# Patient Record
Sex: FEMALE | Race: WHITE | Employment: OTHER | URBAN - METROPOLITAN AREA
[De-identification: names, ages, dates, MRNs, and addresses within clinical notes are randomized per-mention and may not be internally consistent; named-entity substitution may affect disease eponyms.]

---

## 2022-06-25 ENCOUNTER — TRANSCRIBE ORDER (OUTPATIENT)
Dept: SCHEDULING | Age: 67
End: 2022-06-25

## 2022-06-25 DIAGNOSIS — R07.0 THROAT PAIN: ICD-10-CM

## 2022-06-25 DIAGNOSIS — R05.9 COUGH: ICD-10-CM

## 2022-06-25 DIAGNOSIS — Z12.11 SCREEN FOR COLON CANCER: ICD-10-CM

## 2022-06-25 DIAGNOSIS — K21.9 GASTROESOPHAGEAL REFLUX DISEASE: Primary | ICD-10-CM

## 2022-07-13 ENCOUNTER — HOSPITAL ENCOUNTER (OUTPATIENT)
Dept: GENERAL RADIOLOGY | Age: 67
Discharge: HOME | End: 2022-07-13
Attending: INTERNAL MEDICINE
Payer: MEDICARE

## 2022-07-13 DIAGNOSIS — R07.0 THROAT PAIN: ICD-10-CM

## 2022-07-13 DIAGNOSIS — K21.9 GASTROESOPHAGEAL REFLUX DISEASE: ICD-10-CM

## 2022-07-13 DIAGNOSIS — R05.9 COUGH: ICD-10-CM

## 2022-07-13 DIAGNOSIS — Z12.11 SCREEN FOR COLON CANCER: ICD-10-CM

## 2022-07-13 PROCEDURE — 74220 X-RAY XM ESOPHAGUS 1CNTRST: CPT

## 2022-11-11 RX ORDER — AMLODIPINE BESYLATE 5 MG/1
5 TABLET ORAL DAILY
COMMUNITY

## 2022-11-11 RX ORDER — SPIRONOLACTONE 50 MG/1
50 TABLET, FILM COATED ORAL DAILY
COMMUNITY

## 2022-11-11 RX ORDER — PANTOPRAZOLE SODIUM 40 MG/1
40 TABLET, DELAYED RELEASE ORAL DAILY
COMMUNITY

## 2022-11-21 ENCOUNTER — HOSPITAL ENCOUNTER (OUTPATIENT)
Age: 67
Setting detail: OUTPATIENT SURGERY
Discharge: HOME | End: 2022-11-21
Attending: INTERNAL MEDICINE | Admitting: INTERNAL MEDICINE
Payer: MEDICARE

## 2022-11-21 ENCOUNTER — ANESTHESIA (OUTPATIENT)
Dept: ENDOSCOPY | Age: 67
End: 2022-11-21
Payer: MEDICARE

## 2022-11-21 ENCOUNTER — ANESTHESIA EVENT (OUTPATIENT)
Dept: ENDOSCOPY | Age: 67
End: 2022-11-21
Payer: MEDICARE

## 2022-11-21 VITALS
DIASTOLIC BLOOD PRESSURE: 87 MMHG | WEIGHT: 178 LBS | RESPIRATION RATE: 19 BRPM | HEIGHT: 64 IN | HEART RATE: 75 BPM | TEMPERATURE: 98.5 F | SYSTOLIC BLOOD PRESSURE: 99 MMHG | BODY MASS INDEX: 30.39 KG/M2 | OXYGEN SATURATION: 94 %

## 2022-11-21 PROCEDURE — 77030021593 HC FCPS BIOP ENDOSC BSC -A: Performed by: INTERNAL MEDICINE

## 2022-11-21 PROCEDURE — 76040000007: Performed by: INTERNAL MEDICINE

## 2022-11-21 PROCEDURE — 88305 TISSUE EXAM BY PATHOLOGIST: CPT

## 2022-11-21 PROCEDURE — 74011250637 HC RX REV CODE- 250/637: Performed by: INTERNAL MEDICINE

## 2022-11-21 PROCEDURE — 77030041453 HC CAPSULE BRAVO REFLX SYS GVM -B: Performed by: INTERNAL MEDICINE

## 2022-11-21 PROCEDURE — 74011250636 HC RX REV CODE- 250/636: Performed by: ANESTHESIOLOGY

## 2022-11-21 PROCEDURE — 2709999900 HC NON-CHARGEABLE SUPPLY: Performed by: INTERNAL MEDICINE

## 2022-11-21 PROCEDURE — 76060000032 HC ANESTHESIA 0.5 TO 1 HR: Performed by: INTERNAL MEDICINE

## 2022-11-21 PROCEDURE — 74011000250 HC RX REV CODE- 250: Performed by: ANESTHESIOLOGY

## 2022-11-21 RX ORDER — SODIUM CHLORIDE 0.9 % (FLUSH) 0.9 %
5-40 SYRINGE (ML) INJECTION AS NEEDED
Status: DISCONTINUED | OUTPATIENT
Start: 2022-11-21 | End: 2022-11-21 | Stop reason: HOSPADM

## 2022-11-21 RX ORDER — MIDAZOLAM HYDROCHLORIDE 1 MG/ML
.25-1 INJECTION, SOLUTION INTRAMUSCULAR; INTRAVENOUS
Status: DISCONTINUED | OUTPATIENT
Start: 2022-11-21 | End: 2022-11-21 | Stop reason: HOSPADM

## 2022-11-21 RX ORDER — ATROPINE SULFATE 0.1 MG/ML
0.5 INJECTION INTRAVENOUS
Status: DISCONTINUED | OUTPATIENT
Start: 2022-11-21 | End: 2022-11-21 | Stop reason: HOSPADM

## 2022-11-21 RX ORDER — FENTANYL CITRATE 50 UG/ML
100 INJECTION, SOLUTION INTRAMUSCULAR; INTRAVENOUS
Status: DISCONTINUED | OUTPATIENT
Start: 2022-11-21 | End: 2022-11-21 | Stop reason: HOSPADM

## 2022-11-21 RX ORDER — DEXTROMETHORPHAN/PSEUDOEPHED 2.5-7.5/.8
1.2 DROPS ORAL
Status: DISCONTINUED | OUTPATIENT
Start: 2022-11-21 | End: 2022-11-21 | Stop reason: HOSPADM

## 2022-11-21 RX ORDER — SODIUM CHLORIDE 9 MG/ML
50 INJECTION, SOLUTION INTRAVENOUS CONTINUOUS
Status: DISCONTINUED | OUTPATIENT
Start: 2022-11-21 | End: 2022-11-21 | Stop reason: HOSPADM

## 2022-11-21 RX ORDER — LIDOCAINE HYDROCHLORIDE 20 MG/ML
INJECTION, SOLUTION EPIDURAL; INFILTRATION; INTRACAUDAL; PERINEURAL AS NEEDED
Status: DISCONTINUED | OUTPATIENT
Start: 2022-11-21 | End: 2022-11-21 | Stop reason: HOSPADM

## 2022-11-21 RX ORDER — SODIUM CHLORIDE 9 MG/ML
INJECTION, SOLUTION INTRAVENOUS
Status: DISCONTINUED | OUTPATIENT
Start: 2022-11-21 | End: 2022-11-21 | Stop reason: HOSPADM

## 2022-11-21 RX ORDER — NALOXONE HYDROCHLORIDE 0.4 MG/ML
0.4 INJECTION, SOLUTION INTRAMUSCULAR; INTRAVENOUS; SUBCUTANEOUS
Status: DISCONTINUED | OUTPATIENT
Start: 2022-11-21 | End: 2022-11-21 | Stop reason: HOSPADM

## 2022-11-21 RX ORDER — SODIUM CHLORIDE 0.9 % (FLUSH) 0.9 %
5-40 SYRINGE (ML) INJECTION EVERY 8 HOURS
Status: DISCONTINUED | OUTPATIENT
Start: 2022-11-21 | End: 2022-11-21 | Stop reason: HOSPADM

## 2022-11-21 RX ORDER — PROPOFOL 10 MG/ML
INJECTION, EMULSION INTRAVENOUS AS NEEDED
Status: DISCONTINUED | OUTPATIENT
Start: 2022-11-21 | End: 2022-11-21 | Stop reason: HOSPADM

## 2022-11-21 RX ORDER — EPINEPHRINE 0.1 MG/ML
1 INJECTION INTRACARDIAC; INTRAVENOUS
Status: DISCONTINUED | OUTPATIENT
Start: 2022-11-21 | End: 2022-11-21 | Stop reason: HOSPADM

## 2022-11-21 RX ORDER — FLUMAZENIL 0.1 MG/ML
0.2 INJECTION INTRAVENOUS
Status: DISCONTINUED | OUTPATIENT
Start: 2022-11-21 | End: 2022-11-21 | Stop reason: HOSPADM

## 2022-11-21 RX ADMIN — LIDOCAINE HYDROCHLORIDE 100 MG: 20 INJECTION, SOLUTION EPIDURAL; INFILTRATION; INTRACAUDAL; PERINEURAL at 10:29

## 2022-11-21 RX ADMIN — PROPOFOL 40 MG: 10 INJECTION, EMULSION INTRAVENOUS at 10:45

## 2022-11-21 RX ADMIN — SODIUM CHLORIDE: 900 INJECTION, SOLUTION INTRAVENOUS at 10:20

## 2022-11-21 RX ADMIN — LIDOCAINE HYDROCHLORIDE 100 MG: 20 INJECTION, SOLUTION EPIDURAL; INFILTRATION; INTRACAUDAL; PERINEURAL at 10:41

## 2022-11-21 RX ADMIN — PROPOFOL 40 MG: 10 INJECTION, EMULSION INTRAVENOUS at 10:37

## 2022-11-21 RX ADMIN — PROPOFOL 30 MG: 10 INJECTION, EMULSION INTRAVENOUS at 10:34

## 2022-11-21 RX ADMIN — PROPOFOL 80 MG: 10 INJECTION, EMULSION INTRAVENOUS at 10:29

## 2022-11-21 RX ADMIN — PROPOFOL 80 MG: 10 INJECTION, EMULSION INTRAVENOUS at 10:32

## 2022-11-21 RX ADMIN — PROPOFOL 50 MG: 10 INJECTION, EMULSION INTRAVENOUS at 10:57

## 2022-11-21 RX ADMIN — PROPOFOL 50 MG: 10 INJECTION, EMULSION INTRAVENOUS at 10:40

## 2022-11-21 RX ADMIN — PROPOFOL 40 MG: 10 INJECTION, EMULSION INTRAVENOUS at 10:42

## 2022-11-21 RX ADMIN — PROPOFOL 40 MG: 10 INJECTION, EMULSION INTRAVENOUS at 10:49

## 2022-11-21 RX ADMIN — PROPOFOL 50 MG: 10 INJECTION, EMULSION INTRAVENOUS at 10:53

## 2022-11-21 NOTE — DISCHARGE INSTRUCTIONS
[address]  [address], [address] [facility]                                  Brock Husain  [id number]  1955    It was my pleasure seeing you for your procedure. You will also receive a summary report with the findings from this procedure and any further recommendations. If you had polyps removed or biopsies taken during your procedure, you will receive a separate letter from me within the next 2 weeks. If you don't receive this letter or if you have any questions, please call my office [phone]. Please take note of the post procedure instructions listed below. Terry Wishes,    Dr. Justin Rueda    These instructions give you information on caring for yourself after your procedure. Call your doctor if you have any problems or questions after your procedure. HOME CARE  Walk if you have belly cramping or gas. Walking will help get rid of the air and reduce the bloated feeling in your belly (abdomen). Your IV site (where you received drugs) may be tender to touch. Place warm towels on the site; keep your arm up on two pillows if you have any swelling or soreness in the area. You may shower. ACTIVITY:  Take frequent rest periods and move at a slower pace for the next 24 hours. .  You may resume your regular activity tomorrow if you are feeling back to normal.  Do not drive or ride a bicycle for at least 24 hours (because of the medicine (anesthesia) used during the test). Do not sign any important legal documents or use or operate any machinery for 24 hours  Do not take sleeping medicines/nerve drugs for 24 hours unless the doctor tells you. You can return to work/school tomorrow unless otherwise instructed. NUTRITION:  Drink plenty of fluids to keep your pee (urine) clear or pale yellow  Begin with a light meal and progress to your normal diet.  Heavy or fried foods are harder to digest and may make you feel sick to your stomach (nauseated). Once you are feeling back to normal, you may resume your normal diet as instructed by your doctor. Avoid alcoholic beverages for 24 hours or as instructed. IF YOU HAD BIOPSIES TAKEN OR POLYPS REMOVED DURING THE PROCEDURE:  For the next 7 days, avoid all non-steroidal antiinflammatory medications such as Ibuprofen, Motrin, Advil, Alleve, Ning-seltzer, Goody's powder, BC powder. If you do not have an heart condition that requires you to take a daily aspirin, you should avoid taking aspirin for 7 days. Eat a soft diet for 24 hours. Monitor your stools for any blood or dark black, tar-like, stools as this may be a sign of bleeding and if you see any blood, notify your doctor immediately. GET HELP RIGHT AWAY AND SEEK IMMEDIATE MEDICAL CARE IF:  You have more than a spotting of blood in your stool. You pass clumps of tissue (blood clots) or fill the toilet with blood. Your belly is painfully swollen or puffy (abdominal distention). You throw up (vomit). You have a fever. You have redness, pain or swelling at the IV site that last greater than two days. You have abdominal pain or discomfort that is severe or gets worse throughout the day. Post-procedure diagnosis:  1. - Normal Colon  2. - Esophagitis  3. - Hiatal Hernia    Post-procedure recommendations:      EGD Findings:  Esophagus: subtle linear furrows throughout esophagus, biopsies obtained. GE junction 37 cm from the incisors with grade A esophagitis. 3 cm sliding hiatal hernia with intermittent reflux visualized into esophagus.  Bravo clip deployed 6 cm above GE junction   Stomach:normal mucosa, biopsied   Duodenum/jejunum:mild duodenitis, biopsied     Colon Findings:   Rectum: normal  Sigmoid: normal mucosa - biopsied   Descending Colon: normal mucosa - biopsied   Transverse Colon:  normal mucosa - biopsied   Ascending Colon:  normal mucosa - biopsied   Cecum: normal  Terminal Ileum: normal      Recommendations:   - Await pathology and bravo results. You should receive a letter within 2 weeks. - Resume normal medications.  - Recommend repeat colonoscopy in 10 years. Learning About Coronavirus [phone])  Coronavirus [phone]): Overview  What is coronavirus (COVID-19)? The coronavirus disease (COVID-19) is caused by a virus. It is an illness that was first found in [place], [place], in December 2019. It has since spread worldwide. The virus can cause fever, cough, and trouble breathing. In severe cases, it can cause pneumonia and make it hard to breathe without help. It can cause death. Coronaviruses are a large group of viruses. They cause the common cold. They also cause more serious illnesses like Middle East respiratory syndrome (MERS) and severe acute respiratory syndrome (SARS). COVID-19 is caused by a novel coronavirus. That means it's a new type that has not been seen in people before. This virus spreads person-to-person through droplets from coughing and sneezing. It can also spread when you are close to someone who is infected. And it can spread when you touch something that has the virus on it, such as a doorknob or a tabletop. What can you do to protect yourself from coronavirus (COVID-19)? The best way to protect yourself from getting sick is to: Avoid areas where there is an outbreak. Avoid contact with people who may be infected. Wash your hands often with soap or alcohol-based hand sanitizers. Avoid crowds and try to stay at least 6 feet away from other people. Wash your hands often, especially after you cough or sneeze. Use soap and water, and scrub for at least 20 seconds. If soap and water aren't available, use an alcohol-based hand . Avoid touching your mouth, nose, and eyes. What can you do to avoid spreading the virus to others? To help avoid spreading the virus to others:  Cover your mouth with a tissue when you cough or sneeze. Then throw the tissue in the trash.   Use a disinfectant to clean things that you touch often. Stay home if you are sick or have been exposed to the virus. Don't go to school, work, or public areas. And don't use public transportation. If you are sick:  Leave your home only if you need to get medical care. But call the doctor's office first so they know you're coming. And wear a face mask, if you have one. If you have a face mask, wear it whenever you're around other people. It can help stop the spread of the virus when you cough or sneeze. Clean and disinfect your home every day. Use household  and disinfectant wipes or sprays. Take special care to clean things that you grab with your hands. These include doorknobs, remote controls, phones, and handles on your refrigerator and microwave. And don't forget countertops, tabletops, bathrooms, and computer keyboards. When to call for help  Call 911 anytime you think you may need emergency care. For example, call if:  You have severe trouble breathing. (You can't talk at all.)  You have constant chest pain or pressure. You are severely dizzy or lightheaded. You are confused or can't think clearly. Your face and lips have a blue color. You pass out (lose consciousness) or are very hard to wake up. Call your doctor now if you develop symptoms such as:  Shortness of breath. Fever. Cough. If you need to get care, call ahead to the doctor's office for instructions before you go. Make sure you wear a face mask, if you have one, to prevent exposing other people to the virus. Where can you get the latest information? The following health organizations are tracking and studying this virus. Their websites contain the most up-to-date information. [place] also learn what to do if you think you may have been exposed to the virus. U.S. Centers for Disease Control and Prevention (CDC): The CDC provides updated news about the disease and travel advice.  The website also tells you how to prevent the spread of infection. www.cdc.gov  World Health Organization [facility]): WHO offers information about the virus outbreaks. WHO also has travel advice. www.who.int  Current as of: April 1, 2020               Content Version: 12.4  © 6406-7138 Healthwise, [employer]. Care instructions adapted under license by your healthcare professional. If you have questions about a medical condition or this instruction, always ask your healthcare professional. [address] any warranty or liability for your use of this information. Hiatal Hernia: Care Instructions  Your Care Instructions  A hiatal hernia occurs when part of the stomach bulges into the chest cavity. A hiatal hernia may allow stomach acid and juices to back up into the esophagus (acid reflux). This can cause a feeling of burning, warmth, heat, or pain behind the breastbone. This feeling may often occur after you eat, soon after you lie down, or when you bend forward, and it may come and go. You also may have a sour taste in your mouth. These symptoms are commonly known as heartburn or reflux. But not all hiatal hernias cause symptoms. Follow-up care is a key part of your treatment and safety. Be sure to make and go to all appointments, and call your doctor if you are having problems. It's also a good idea to know your test results and keep a list of the medicines you take. How can you care for yourself at home? Take your medicines exactly as prescribed. Call your doctor if you think you are having a problem with your medicine. Do not take aspirin or other nonsteroidal anti-inflammatory drugs (NSAIDs), such as ibuprofen (Advil, Motrin) or naproxen (Aleve), unless your doctor says it is okay. Ask your doctor what you can take for pain. Your doctor may recommend over-the-counter medicine. For mild or occasional indigestion, antacids such as Tums, Gaviscon, Maalox, or Mylanta may help.  Your doctor also may recommend over-the-counter acid reducers, such as famotidine (Pepcid AC), cimetidine (Tagamet HB), or omeprazole (Prilosec). Read and follow all instructions on the label. If you use these medicines often, talk with your doctor. Change your eating habits. It's best to eat several small meals instead of two or three large meals. After you eat, wait 2 to 3 hours before you lie down. Late-night snacks aren't a good idea. Avoid foods that make your symptoms worse. These may include chocolate, mint, alcohol, pepper, spicy foods, high-fat foods, or drinks with caffeine in them, such as tea, coffee, slim, or energy drinks. If your symptoms are worse after you eat a certain food, you may want to stop eating it to see if your symptoms get better. Do not smoke or chew tobacco.  If you get heartburn at night, raise the head of your bed 6 to 8 inches by putting the frame on blocks or placing a foam wedge under the head of your mattress. (Adding extra pillows does not work.)  Do not wear tight clothing around your middle. Lose weight if you need to. Losing just 5 to 10 pounds can help. When should you call for help? Call your doctor now or seek immediate medical care if:    You have new or worse belly pain. You are vomiting. Watch closely for changes in your health, and be sure to contact your doctor if:    You have new or worse symptoms of indigestion. You have trouble or pain swallowing. You are losing weight. You do not get better as expected. Where can you learn more? Go to http://www.[employer].com/  Enter T074 in the search box to learn more about \"Hiatal Hernia: Care Instructions. \"  Current as of: June 6, 2022               Content Version: 13.4  © 5940-1437 Healthwise, [employer]. Care instructions adapted under license by [employer] (which disclaims liability or warranty for this information).  If you have questions about a medical condition or this instruction, always ask your healthcare professional. [address] any warranty or liability for your use of this information.

## 2022-11-21 NOTE — PROCEDURES
[address]  [address], [address]  [phone]                           Colonoscopy and EGD Procedure Note      Indications:   GERD, throat discomfort, screening colonoscopy, history loose stools       :  Tanika Lee MD    Staff: Endoscopy Technician-1: Ramos Stokes  Endoscopy RN-1: Marcheta Bernheim, RN    Referring Provider: Jono Thomason MD    Sedation:  MAC anesthesia    Procedure Details:  After informed consent was obtained with all risks and benefits of procedure explained and pre-operative exam completed, pt was placed in the left lateral decubitus position. Following sequential administration of sedation as per above, the gastroscope was inserted into the mouth and advanced under direct vision to second portion of the duodenum. A careful inspection was made as the gastroscope was withdrawn, including a retroflexed view of the proximal stomach; findings and interventions are described below. EGD Findings:  Esophagus: subtle linear furrows throughout esophagus, biopsies obtained. GE junction 37 cm from the incisors with grade A esophagitis. 3 cm sliding hiatal hernia with intermittent reflux visualized into esophagus. Bravo clip deployed 6 cm above GE junction   Stomach:normal mucosa, biopsied   Duodenum/jejunum: mild duodenitis, biopsied     EGD Interventions:  biopsies and bravo clip     The bed was then turned and upon sequential sedation as per above, a digital rectal exam was performed per below. The Olympus videocolonoscope was inserted in the rectum and carefully advanced to the terminal ileum. The quality of preparation was good. [place] Bowel Prep Score  3/3/3. The colonoscope was slowly withdrawn with careful evaluation between folds. Retroflexion in the rectum was performed.      Colon Findings:   Rectum: normal  Sigmoid: normal mucosa - biopsied   Descending Colon: normal mucosa - biopsied   Transverse Colon:  normal mucosa - biopsied   Ascending Colon:  normal mucosa - biopsied   Cecum: normal  Terminal Ileum: normal    Colonoscopy Interventions:  biopsies            Specimens Removed:    ID Type Source Tests Collected by Time Destination   1 : Duodenum Biopsies Preservative Duodenum  Zoila Lopez MD 11/21/2022 1039 Pathology   2 : Gastric Biopsies Preservative Gastric  Zoila Lopez MD 11/21/2022 1044 Pathology   3 : Distal Esophagus Biopsies Preservative Esophagus, Distal  Zoila Lopez MD 11/21/2022 1046 Pathology   4 : Proximal Esophagus Biopsies Preservative Esophagus, Proximal  Zoila Lopez MD 11/21/2022 1048 Pathology   5 : Random Colon Biopsies Preservative Colon  Zoila Lopez MD 11/21/2022 1056 Pathology       Complications: None. EBL:  minimal     Impression:    See Postoperative diagnosis above    Recommendations:   - Await pathology and bravo results. You should receive a letter within 2 weeks. - Resume normal medications.  - Recommend repeat colonoscopy in 10 years. Discharge Disposition:  Home in the company of a  when able to ambulate.     Maritza Green MD  11/21/2022  11:04 AM

## 2022-11-21 NOTE — ANESTHESIA POSTPROCEDURE EVALUATION
Procedure(s):  COLONOSCOPY, ESOPHAGOGASTRODUODENOSCOPY (EGD) WITH BRAVO CLIP  ESOPHAGOGASTRODUODENOSCOPY (EGD)  BRAVO CLIP PLACEMENT  ESOPHAGOGASTRODUODENAL (EGD) BIOPSY  COLON BIOPSY. MAC    Anesthesia Post Evaluation        Patient location during evaluation: PACU  Patient participation: complete - patient participated  Level of consciousness: awake and alert  Pain management: adequate  Airway patency: patent  Anesthetic complications: no  Cardiovascular status: acceptable  Respiratory status: acceptable  Hydration status: acceptable  Comments: I have seen and evaluated the patient and is ready for discharge. Yaniv Disla MD    Post anesthesia nausea and vomiting:  none      INITIAL Post-op Vital signs:   Vitals Value Taken Time   /82 11/21/22 1115   Temp     Pulse 80 11/21/22 1117   Resp 20 11/21/22 1117   SpO2 94 % 11/21/22 1115   Vitals shown include unvalidated device data.

## 2023-03-16 ENCOUNTER — TRANSCRIBE ORDER (OUTPATIENT)
Dept: SCHEDULING | Age: 68
End: 2023-03-16

## 2023-03-16 DIAGNOSIS — Z13.820 SCREENING FOR OSTEOPOROSIS: Primary | ICD-10-CM

## (undated) DEVICE — SOLUTION IRRIG 1000ML H2O STRL BLT

## (undated) DEVICE — FCPS RAD JAW 4LC 240CM W/NDL -- BX/40

## (undated) DEVICE — TUBING HYDR IRR --

## (undated) DEVICE — BRAVO CF CAPSULE  DELIVERY DEV, 5-PK: Brand: BRAVO